# Patient Record
Sex: FEMALE | Race: WHITE | HISPANIC OR LATINO | Employment: UNEMPLOYED | URBAN - METROPOLITAN AREA
[De-identification: names, ages, dates, MRNs, and addresses within clinical notes are randomized per-mention and may not be internally consistent; named-entity substitution may affect disease eponyms.]

---

## 2017-07-13 ENCOUNTER — ALLSCRIPTS OFFICE VISIT (OUTPATIENT)
Dept: OTHER | Facility: OTHER | Age: 37
End: 2017-07-13

## 2017-07-19 LAB
ATOPOBIUM VAGINAE (HISTORICAL): NOT DETECTED LOG (CELLS/ML)
BV CATEGORY (HISTORICAL): ABNORMAL
C. ALBICANS, DNA OR PCR (HISTORICAL): NOT DETECTED
C. GLABRATA, DNA OR PCR (HISTORICAL): NOT DETECTED
C. PARAPSILOSIS, DNA OR PCR (HISTORICAL): NOT DETECTED
C. TROPICALIS, DNA OR PCR (HISTORICAL): NOT DETECTED
CHLAMYDIA TRACHOMATIS BY MOL. METHOD (HISTORICAL): NOT DETECTED
GARDNERELLA VAGINALIS (HISTORICAL): 6.8 LOG (CELLS/ML)
GC BY MOL. METHOD (HISTORICAL): NOT DETECTED
LACTOBACILLUS (SPECIES) (HISTORICAL): NOT DETECTED LOG (CELLS/ML)
MEGASPHAERA TYPE 1 (HISTORICAL): NOT DETECTED LOG (CELLS/ML)
TRICHOMONAS (HISTORICAL): NOT DETECTED

## 2018-01-14 VITALS
BODY MASS INDEX: 21.3 KG/M2 | WEIGHT: 108.5 LBS | DIASTOLIC BLOOD PRESSURE: 72 MMHG | SYSTOLIC BLOOD PRESSURE: 110 MMHG | HEIGHT: 60 IN

## 2018-06-01 DIAGNOSIS — Z30.8 ENCOUNTER FOR OTHER CONTRACEPTIVE MANAGEMENT: Primary | ICD-10-CM

## 2018-06-04 DIAGNOSIS — Z30.8 ENCOUNTER FOR OTHER CONTRACEPTIVE MANAGEMENT: ICD-10-CM

## 2018-06-04 RX ORDER — LEVONORGESTREL/ETHINYL ESTRADIOL AND ETHINYL ESTRADIOL 100-20(84)
KIT ORAL
Qty: 182 TABLET | Refills: 0 | Status: SHIPPED | OUTPATIENT
Start: 2018-06-04 | End: 2018-06-04 | Stop reason: SDUPTHER

## 2018-06-05 RX ORDER — LEVONORGESTREL AND ETHINYL ESTRADIOL 100-20(84)
1 KIT ORAL DAILY
Qty: 182 TABLET | Refills: 0 | Status: SHIPPED | OUTPATIENT
Start: 2018-06-05

## 2018-06-27 ENCOUNTER — ULTRASOUND (OUTPATIENT)
Dept: GYNECOLOGY | Facility: CLINIC | Age: 38
End: 2018-06-27
Payer: COMMERCIAL

## 2018-06-27 ENCOUNTER — OFFICE VISIT (OUTPATIENT)
Dept: GYNECOLOGY | Facility: CLINIC | Age: 38
End: 2018-06-27
Payer: COMMERCIAL

## 2018-06-27 DIAGNOSIS — N92.0 MENORRHAGIA WITH REGULAR CYCLE: Primary | ICD-10-CM

## 2018-06-27 PROCEDURE — 58340 CATHETER FOR HYSTEROGRAPHY: CPT | Performed by: NURSE PRACTITIONER

## 2018-06-27 PROCEDURE — 88305 TISSUE EXAM BY PATHOLOGIST: CPT | Performed by: PATHOLOGY

## 2018-06-27 PROCEDURE — 76831 ECHO EXAM UTERUS: CPT | Performed by: NURSE PRACTITIONER

## 2018-06-27 PROCEDURE — 76830 TRANSVAGINAL US NON-OB: CPT | Performed by: NURSE PRACTITIONER

## 2018-06-27 PROCEDURE — 58100 BIOPSY OF UTERUS LINING: CPT | Performed by: NURSE PRACTITIONER

## 2018-06-27 PROCEDURE — 99213 OFFICE O/P EST LOW 20 MIN: CPT | Performed by: NURSE PRACTITIONER

## 2018-06-27 NOTE — PROGRESS NOTES
Assessment/Plan:    Menorrhagia     Diagnoses and all orders for this visit:    Menorrhagia with regular cycle      Schedule surgery    Subjective: The pt; presents today for evaluation of her heavy bleeding        Patient ID: Tamara Gary is a 42 y o  female  HPI The pt  presents today for further evaluation of her heavy bleeding  She is here today for a TVS/EMB/SIS  She is currently living in [facility] and was unable to have this testing done last year  She has been using OC's to help control her heavy periods  However she states that even if she misses one pill she will bleed heavy  The following portions of the patient's history were reviewed and updated as appropriate: allergies, current medications, past family history, past medical history, past social history, past surgical history and problem list     Review of Systems   Constitutional: Negative  Genitourinary: Positive for menstrual problem and vaginal bleeding  Negative for vaginal discharge and vaginal pain  Psychiatric/Behavioral: Negative  Objective: There were no vitals taken for this visit  Physical Exam   Constitutional: She is oriented to person, place, and time  She appears well-developed and well-nourished  Genitourinary:   Genitourinary Comments: See TVS result   Neurological: She is alert and oriented to person, place, and time  Psychiatric: She has a normal mood and affect  Her behavior is normal        Endometrial biopsy  Date/Time: 6/27/2018 3:28 PM  Performed by: Yanira Galindo  Authorized by: Yanira Galindo     Consent:     Consent obtained:  Verbal and written    Consent given by:  Patient    Procedural risks discussed:  Bleeding, failure rate, infection and repeat procedure    Patient questions answered: yes      Patient agrees, verbalizes understanding, and wants to proceed: yes      Instructions and paperwork completed: yes    Indication:     Indications:  Other disorder of menstruation and other abnormal bleeding from female genital tract    Pre-procedure:     Anesthesia premedication: None  Procedure:     Procedure: endometrial biopsy with Pipelle      A bivalve speculum was placed in the vagina: yes      Cervix cleaned and prepped: yes      Uterus sounded: yes      Uterus sound depth (cm):  7    Specimen collected: specimen collected and sent to pathology      Patient tolerated procedure well with no complications: yes    Findings:     Uterus size (weeks): The SIS was done and did show the presence of an endometrial polyp  Surgery has been scheduled  PLAN: We discussed her menorrhagia and today's results which do show an endometrial polyp  She is aware that this will need to be removed and surgery has been scheduled for 7/6/18  She will continue with the use of her OC's as directed

## 2018-06-27 NOTE — PROGRESS NOTES
AMB US Pelvic Non OB  Date/Time: 6/27/2018 2:11 PM  Performed by: Carmen Sosa  Authorized by: Praveen Slater     Procedure details:     Indications: non-obstetric vaginal bleeding      Technique:  Transvaginal US, Non-OB    Position: lithotomy exam    Uterine findings:     Diameter (mm):  73    Length (mm):  100    Width (mm):  51    Endometrial stripe: identified      Endometrium thickness (mm):  13 7  Left ovary findings:     Left ovary:  Visualized    Diameter (mm):  18 1    Length (mm):  27 7    Width (mm):  26  Right ovary findings:     Right ovary:  Visualized    Diameter (mm):  27 6    Length (mm):  38 4    Width (mm):  29 1  Other findings:     Free pelvic fluid: not identified      Free peritoneal fluid: not identified    Post-Procedure Details:     Impression:  Anteverted uterus is inhomogeneous throughout with posterior shadowing suggesting leiomyomatous changes without distinct fibroids  The bilateral ovaries appear within normal limits and each contain a follicle; RT 6 4RT and LT 1 5cm  No free fluid  Tolerance: Tolerated well, no immediate complications    Complications: no complications    Additional Procedure Comments:      Patient uses oral contraception and her  had a vasectomy as her form of birth control  GE Logiq P5 transvaginal transducer E8C with Serial Number [id number] was used during procedure and subsequently cleaned with high level disinfection utilizing the [employer]     Sonohysterogram  Date/Time: 6/27/2018 2:14 PM  Performed by: Janice Bowman by: Praveen Slater     Consent:     Consent obtained:  Verbal and written    Consent given by:  Patient    Patient questions answered: yes      Patient agrees, verbalizes understanding, and wants to proceed: yes    Pre-procedure:     Pre-procedure timeout performed: yes      Prepped with: Betadine    Procedure:     Cervix cleaned and prepped: yes      Catheter inserted: yes      Uterine cavity distended with saline: yes    Comments:      Sonohysterogram demonstrates a polyp within the endometrium  Endometrial biopsy  Date/Time: 6/27/2018 2:14 PM  Performed by: Aliya Pool by: Amy Bass     Consent:     Consent obtained:  Verbal and written    Consent given by:  Patient    Patient questions answered: yes      Patient agrees, verbalizes understanding, and wants to proceed: yes    Indication:     Indications:  Other disorder of menstruation and other abnormal bleeding from female genital tract    Pre-procedure:     Pre-procedure timeout performed: yes    Procedure:     Procedure: endometrial biopsy with Pipelle      A bivalve speculum was placed in the vagina: yes      Cervix cleaned and prepped: yes      Specimen collected: specimen collected and sent to pathology      Patient tolerated procedure well with no complications: yes

## 2018-07-06 ENCOUNTER — PROCEDURE VISIT (OUTPATIENT)
Dept: GYNECOLOGY | Facility: CLINIC | Age: 38
End: 2018-07-06

## 2018-07-06 VITALS
HEIGHT: 60 IN | BODY MASS INDEX: 21.36 KG/M2 | WEIGHT: 108.8 LBS | DIASTOLIC BLOOD PRESSURE: 70 MMHG | SYSTOLIC BLOOD PRESSURE: 101 MMHG

## 2018-07-06 DIAGNOSIS — N92.0 MENORRHAGIA WITH REGULAR CYCLE: ICD-10-CM

## 2018-07-06 DIAGNOSIS — N84.0 ENDOMETRIAL POLYP: ICD-10-CM

## 2018-07-06 DIAGNOSIS — R52 PAIN: Primary | ICD-10-CM

## 2018-07-06 PROCEDURE — 88305 TISSUE EXAM BY PATHOLOGIST: CPT | Performed by: PATHOLOGY

## 2018-07-06 PROCEDURE — 58563 HYSTEROSCOPY ABLATION: CPT | Performed by: OBSTETRICS & GYNECOLOGY

## 2018-07-06 RX ORDER — OXYCODONE HYDROCHLORIDE AND ACETAMINOPHEN 5; 325 MG/1; MG/1
1 TABLET ORAL EVERY 4 HOURS PRN
Qty: 6 TABLET | Refills: 0 | Status: SHIPPED | OUTPATIENT
Start: 2018-07-06

## 2018-07-06 NOTE — PROGRESS NOTES
Endometrial ablation  Date/Time: 7/6/2018 8:53 AM  Performed by: Ambrosio Spann by: Breezy Thomas     Consent:     Consent obtained:  Written    Consent given by:  Patient    Procedural risks discussed:  Bleeding, failure rate, infection, possible continued pain, repeat procedure and damage to other organs    Patient questions answered: yes      Patient agrees, verbalizes understanding, and wants to proceed: yes      Educational handouts given: no      Instructions and paperwork completed: yes    Indication:     Indications: Excessive or frequent menstruation and Other disorder of menstruation and other abnormal bleeding from female genital tract    Pre-procedure:     Negative urine pregnancy test: yes      Pre-procedure timeout performed: yes      Premeds:  Ibuprofen    Prepped with: Betadine    Procedure:     Cervix cleaned and prepped: yes      Tenaculum applied to cervix: yes      Uterus sounded: yes      Uterus sound depth (cm):  7    Cervix dilated: yes      Cervix dilated with:  Lucy    Hysteroscopic guidance used: yes      Uterine cavity curetted, scant material obtained and sent to pathology: yes      Uterine lining ablated: yes      Uterine lining ablation method:  Electricity    Ablation time (minutes):  1    Ablation time (seconds):  43    Probe removed and hemostasis verified: yes    Post-procedure:     Patient observed: yes      Patient observation time:  20 minutes    No complications: yes      Estimated blood loss (mL):  5    Post procedure instructions given to patient: yes      Nothing in vagina for 2 weeks: yes      Patient tolerated procedure well with no complications: yes    Comments:      Done under IV sedation  6mm endometrial polyp removed with sharp curette